# Patient Record
(demographics unavailable — no encounter records)

---

## 2024-11-06 NOTE — PHYSICAL EXAM
[Chaperone Present] : A chaperone was present in the examining room during all aspects of the physical examination [47292] : A chaperone was present during the pelvic exam. [Well developed] : well developed [Well Nourished] : ~L well nourished [Good Hygeine] : demonstrates good hygeine [Normal Mood/Affect] : mood and affect are normal [Normal Appearance] : general appearance was normal [Atrophy] : atrophy [2] : 2 [Normal] : normal [Uterine Adnexae] : were not tender and not enlarged [Post Void Residual ____ml] : post void residual was [unfilled] ml [Normal rectal exam] : was normal [FreeTextEntry2] : [place] [Anxiety] : patient is not anxious [FreeTextEntry3] : Positive hypermobility

## 2024-11-06 NOTE — DISCUSSION/SUMMARY
[FreeTextEntry1] : I reviewed the above findings with the patient.  She has urinary incontinence with predominant stress urinary incontinence complaints.    Treatment options for the stress incontinence were discussed and included doing nothing, behavioral modification, Kegel's exercises with and without PT, medications, a pessary or intravaginal devices, periurethral bulking, and surgical correction with a suburethral sling v Hoffman v autologous sling.  We also discussed possible overactive bladder She also has fecal incontinence with loss stools and we discussed bulking of the stool with FiberCon.  We also discussed bulking injections surgery and SNM. She would like to start with conservative management with continue with Kegel exercises and bulking up her stool.  We discussed following up in the office in 3 months if she is still symptomatic.  I [facility] patient information on stress incontinence, fecal incontinence, and overactive bladder was given to her.  All questions were answered.

## 2024-11-06 NOTE — HISTORY OF PRESENT ILLNESS
[Rectal Prolapse] : no [Urinary Frequency] : no [Feelings Of Urinary Urgency] : no [Urinary Tract Infection] : no [x2] : nocturia two times a night [] : yes [Uses ___ pads per day] : uses [unfilled] pad(s) per day [FreeTextEntry4] : had episoode of bleeding in 2/24 - had endometrial bx [de-identified] : daily with laugh, cough, sneeze [de-identified] : sometimes [de-identified] : loose fecal incontinence hx of right hemicolectomy [de-identified] : not sexually active [FreeTextEntry1] : last colonscopy  or  Patient reports a forceps delivery with her second child.  The first 1 was a  Patient reports doing Kegel exercises

## 2024-11-06 NOTE — HISTORY OF PRESENT ILLNESS
[Rectal Prolapse] : no [Urinary Frequency] : no [Feelings Of Urinary Urgency] : no [Urinary Tract Infection] : no [x2] : nocturia two times a night [] : yes [Uses ___ pads per day] : uses [unfilled] pad(s) per day [FreeTextEntry4] : had episoode of bleeding in 2/24 - had endometrial bx [de-identified] : daily with laugh, cough, sneeze [de-identified] : loose fecal incontinence hx of right hemicolectomy [de-identified] : sometimes [de-identified] : not sexually active [FreeTextEntry1] : last colonscopy  or  Patient reports a forceps delivery with her second child.  The first 1 was a  Patient reports doing Kegel exercises